# Patient Record
Sex: MALE | URBAN - METROPOLITAN AREA
[De-identification: names, ages, dates, MRNs, and addresses within clinical notes are randomized per-mention and may not be internally consistent; named-entity substitution may affect disease eponyms.]

---

## 2020-08-01 ENCOUNTER — HOSPITAL ENCOUNTER (OUTPATIENT)
Dept: TELEMEDICINE | Facility: HOSPITAL | Age: 65
Discharge: HOME | End: 2020-08-01
Payer: COMMERCIAL

## 2020-08-01 DIAGNOSIS — I63.9 SMALL VESSEL STROKE: ICD-10-CM

## 2020-08-01 PROCEDURE — G0425 INPT/ED TELECONSULT30: HCPCS | Mod: 95,,, | Performed by: PSYCHIATRY & NEUROLOGY

## 2020-08-01 PROCEDURE — G0425 PR INPT TELEHEALTH CONSULT 30M: ICD-10-PCS | Mod: 95,,, | Performed by: PSYCHIATRY & NEUROLOGY

## 2020-08-01 NOTE — ASSESSMENT & PLAN NOTE
66 y/o man with PMH HTN, smoking who presents with R hemiparesis, likely due to small vessel stroke.  Not in the window for TPA and no cortical signs to suggest LVO.  Recommend transfer to hospital for MRI, stroke workup.    Antithrombotics for secondary stroke prevention: Antiplatelets: Aspirin: 81 mg daily  Clopidogrel: 300 mg loading dose x 1, now (if able to safely swallow)    Statins for secondary stroke prevention and hyperlipidemia, if present:   Statins: Atorvastatin- 80 mg daily    Aggressive risk factor modification: HTN, Smoking     Rehab efforts: The patient has been evaluated by a stroke team provider and the therapy needs have been fully considered based off the presenting complaints and exam findings. The following therapy evaluations are needed: PT evaluate and treat, OT evaluate and treat, SLP evaluate and treat, PM&R evaluate for appropriate placement    Diagnostics ordered/pending: HgbA1C to assess blood glucose levels, Lipid Profile to assess cholesterol levels, MRA head to assess vasculature, MRA neck/arch to assess vasculature, MRI head without contrast to assess brain parenchyma, TTE to assess cardiac function/status , TSH to assess thyroid function    VTE prophylaxis: Heparin 5000 units SQ every 8 hours    BP parameters: Infarct: No intervention, SBP <220

## 2020-08-01 NOTE — CONSULTS
[facility]  Vascular Neurology  Comprehensive Stroke Center  Tele-Consultation Note      Consults    Consulting Provider: LANI DOUGLASS III  Current Providers  No providers found    Patient Location: [facility] - [facility] Department  Spoke hospital nurse at bedside with patient assisting consultant.     Patient information was obtained from patient.         Assessment/Plan:     STROKE DOCUMENTATION     Acute Stroke Times:   Acute Stroke Times   Last Known Normal Date: 07/31/20  Last Known Normal Time: 2100  Stroke Team Called Date: 08/01/20  Stroke Team Called Time: 1202  Stroke Team Arrival Date: 08/01/20  Stroke Team Arrival Time: 1215  CT Interpretation Time: 1220    NIH Scale:  1a. Level of Consciousness: 0-->Alert, keenly responsive  1b. LOC Questions: 0-->Answers both questions correctly  1c. LOC Commands: 0-->Performs both tasks correctly  2. Best Gaze: 0-->Normal  3. Visual: 0-->No visual loss  4. Facial Palsy: 2-->Partial paralysis (total or near-total paralysis of lower face)  5a. Motor Arm, Left: 0-->No drift, limb holds 90 (or 45) degrees for full 10 secs  5b. Motor Arm, Right: 4-->No movement  6a. Motor Leg, Left: 0-->No drift, leg holds 30 degree position for full 5 secs  6b. Motor Leg, Right: 3-->No effort against gravity, leg falls to bed immediately  7. Limb Ataxia: 0-->Absent  8. Sensory: 0-->Normal, no sensory loss  9. Best Language: 0-->No aphasia, normal  10. Dysarthria: 2-->Severe dysarthria, patients speech is so slurred as to be unintelligible in the absence of or out of proportion to any dysphasia, or is mute/anarthric  11. Extinction and Inattention (formerly Neglect): 0-->No abnormality  Total (NIH Stroke Scale): 11     Modified [place]    [place] Coma Scale:    ABCD2 Score:    [id number] Score:   HAS -BLED Score:   ICH Score:   Hunt & Noel Classification:       Diagnoses:   Small vessel stroke  64 y/o man with  PMH HTN, smoking who presents with R hemiparesis, likely due to small vessel stroke.  Not in the window for TPA and no cortical signs to suggest LVO.  Recommend transfer to hospital for MRI, stroke workup.    Antithrombotics for secondary stroke prevention: Antiplatelets: Aspirin: 81 mg daily  Clopidogrel: 300 mg loading dose x 1, now (if able to safely swallow)    Statins for secondary stroke prevention and hyperlipidemia, if present:   Statins: Atorvastatin- 80 mg daily    Aggressive risk factor modification: HTN, Smoking     Rehab efforts: The patient has been evaluated by a stroke team provider and the therapy needs have been fully considered based off the presenting complaints and exam findings. The following therapy evaluations are needed: PT evaluate and treat, OT evaluate and treat, SLP evaluate and treat, PM&R evaluate for appropriate placement    Diagnostics ordered/pending: HgbA1C to assess blood glucose levels, Lipid Profile to assess cholesterol levels, MRA head to assess vasculature, MRA neck/arch to assess vasculature, MRI head without contrast to assess brain parenchyma, TTE to assess cardiac function/status , TSH to assess thyroid function    VTE prophylaxis: Heparin 5000 units SQ every 8 hours    BP parameters: Infarct: No intervention, SBP <220            There were no vitals taken for this visit.  Alteplase Eligible?: No  Alteplase Recommendation: Alteplase not recommended due to Outside of treatment window   Possible Interventional Revascularization Candidate? No; No large vessel occlusion    Disposition Recommendation: transfer to nearest appropriate facility     Subjective:     History of Present Illness:  66 y/o man with HTN, tobacco abuse, asthma who presents with R hemiparesis.  Patient was at baseline when he went to bed last night.  When he awoke at 4 am today he had R sided weakness and slurred speech.  Denies prior history of stroke.      Woke up with symptoms?: yes    Recent bleeding  noted: no  Does the patient take any Blood Thinners? no  Medications: No relevant medications      Past Medical History: hypertension and hyperlipidemia    Past Surgical History: no relevant surgical history    Family History: no relevant history    Social History: smoker (active)    Allergies: Allergies have not been reviewed No relevant allergies    Review of Systems   Unable to perform ROS: Other     Objective:   Vitals: There were no vitals taken for this visit. BP: 166/111    CT READ: Yes  No hemmorhage. No mass effect. No early infarct signs.     Physical Exam  Constitutional:       General: He is not in acute distress.  HENT:      Head: Normocephalic and atraumatic.   Eyes:      Pupils: Pupils are equal, round, and reactive to light.   Pulmonary:      Effort: Pulmonary effort is normal.   Neurological:      Comments: MS: A&XO3, speech fluent, follows commands, no neglect  CN: PERRL, EOMI, sensation intact, R facial droop, severe dysarthria  Motor: 0/5 R arm, 1/5 proximal R leg  Sens: intact to LT  Coord: no ataxia on finger to nose                   Recommended the emergency room physician to have a brief discussion with the patient and/or family if available regarding the risks and benefits of treatment, and to briefly document the occurrence of that discussion in his clinical encounter note.     The attending portion of this evaluation, treatment, and documentation was performed per Jessi Gaona MD via audiovisual.    Billing code:  (moderate to severe stroke, large areas of edema, some mimics)    · This patient has a critical neurological condition/illness, with high morbidity and mortality.  · There is a high probability for acute neurological change leading to clinical and possibly life-threatening deterioration requiring highest level of physician preparedness for urgent intervention.  · Care was coordinated with other physicians involved in the patient's care.  · Radiologic studies and  laboratory data were reviewed and interpreted, and plan of care was re-assessed based on the results.  · Diagnosis, treatment options and prognosis may have been discussed with the patient and/or family members or caregiver.  · Further advanced medical management and further evaluation is warranted for his care.      In your opinion, this was a: Tier 2 Van Negative    Consult End Time: 12:50 PM     Jessi Gaona MD  [facility]  Vascular Neurology   [facility]

## 2020-08-01 NOTE — SUBJECTIVE & OBJECTIVE
Woke up with symptoms?: yes    Recent bleeding noted: no  Does the patient take any Blood Thinners? no  Medications: No relevant medications      Past Medical History: hypertension and hyperlipidemia    Past Surgical History: no relevant surgical history    Family History: no relevant history    Social History: smoker (active)    Allergies: Allergies have not been reviewed No relevant allergies    Review of Systems   Unable to perform ROS: Other     Objective:   Vitals: There were no vitals taken for this visit. BP: 166/111    CT READ: Yes  No hemmorhage. No mass effect. No early infarct signs.     Physical Exam  Constitutional:       General: He is not in acute distress.  HENT:      Head: Normocephalic and atraumatic.   Eyes:      Pupils: Pupils are equal, round, and reactive to light.   Pulmonary:      Effort: Pulmonary effort is normal.   Neurological:      Comments: MS: A&XO3, speech fluent, follows commands, no neglect  CN: PERRL, EOMI, sensation intact, R facial droop, severe dysarthria  Motor: 0/5 R arm, 1/5 proximal R leg  Sens: intact to LT  Coord: no ataxia on finger to nose

## 2020-08-01 NOTE — HPI
66 y/o man with HTN, tobacco abuse, asthma who presents with R hemiparesis.  Patient was at baseline when he went to bed last night.  When he awoke at 4 am today he had R sided weakness and slurred speech.  Denies prior history of stroke.